# Patient Record
Sex: MALE | Race: WHITE
[De-identification: names, ages, dates, MRNs, and addresses within clinical notes are randomized per-mention and may not be internally consistent; named-entity substitution may affect disease eponyms.]

---

## 2019-02-20 ENCOUNTER — HOSPITAL ENCOUNTER (OUTPATIENT)
Dept: HOSPITAL 75 - CARD | Age: 59
End: 2019-02-20
Attending: INTERNAL MEDICINE
Payer: COMMERCIAL

## 2019-02-20 VITALS — SYSTOLIC BLOOD PRESSURE: 166 MMHG | DIASTOLIC BLOOD PRESSURE: 88 MMHG

## 2019-02-20 VITALS — WEIGHT: 223 LBS | BODY MASS INDEX: 31.92 KG/M2 | HEIGHT: 70 IN

## 2019-02-20 DIAGNOSIS — I10: ICD-10-CM

## 2019-02-20 DIAGNOSIS — R53.83: ICD-10-CM

## 2019-02-20 DIAGNOSIS — R06.02: ICD-10-CM

## 2019-02-20 DIAGNOSIS — E11.9: Primary | ICD-10-CM

## 2019-02-20 LAB
ALBUMIN SERPL-MCNC: 4.7 GM/DL (ref 3.2–4.5)
ALP SERPL-CCNC: 86 U/L (ref 40–136)
ALT SERPL-CCNC: 17 U/L (ref 0–55)
BILIRUB SERPL-MCNC: 0.3 MG/DL (ref 0.1–1)
BUN/CREAT SERPL: 13
CALCIUM SERPL-MCNC: 10.2 MG/DL (ref 8.5–10.1)
CHLORIDE SERPL-SCNC: 105 MMOL/L (ref 98–107)
CHOLEST SERPL-MCNC: 294 MG/DL (ref ?–200)
CO2 SERPL-SCNC: 25 MMOL/L (ref 21–32)
CREAT SERPL-MCNC: 1.26 MG/DL (ref 0.6–1.3)
GFR SERPLBLD BASED ON 1.73 SQ M-ARVRAT: 59 ML/MIN
GLUCOSE SERPL-MCNC: 213 MG/DL (ref 70–105)
HDLC SERPL-MCNC: 55 MG/DL (ref 40–60)
POTASSIUM SERPL-SCNC: 4.6 MMOL/L (ref 3.6–5)
PROT SERPL-MCNC: 7.5 GM/DL (ref 6.4–8.2)
SODIUM SERPL-SCNC: 138 MMOL/L (ref 135–145)
TRIGL SERPL-MCNC: 116 MG/DL (ref ?–150)
VLDLC SERPL CALC-MCNC: 23 MG/DL (ref 5–40)

## 2019-02-20 PROCEDURE — 84153 ASSAY OF PSA TOTAL: CPT

## 2019-02-20 PROCEDURE — 78452 HT MUSCLE IMAGE SPECT MULT: CPT

## 2019-02-20 PROCEDURE — 93017 CV STRESS TEST TRACING ONLY: CPT

## 2019-02-20 PROCEDURE — 80053 COMPREHEN METABOLIC PANEL: CPT

## 2019-02-20 PROCEDURE — 36415 COLL VENOUS BLD VENIPUNCTURE: CPT

## 2019-02-20 PROCEDURE — 93306 TTE W/DOPPLER COMPLETE: CPT

## 2019-02-20 PROCEDURE — 80061 LIPID PANEL: CPT

## 2019-02-20 NOTE — STRESS TEST
DATE OF SERVICE:  02/20/2019



LEXISCAN MYOVIEW STRESS TEST



REFERRING PHYSICIAN:

Dr. Piper Austin.



Baseline heart rate is 61, baseline blood pressure 139/91.  Baseline EKG is

sinus rhythm with no ischemic changes.



In summary, the patient was injected with 10.45 mCi of technetium-99 Myoview and

the resting images were obtained and the patient started exercising with a

baseline heart rate, blood pressure and EKG mentioned above.  The patient was

able to exercise for a total of 6 minutes on standard Vel protocol.  With peak

exercise level, EKG was showing nondiagnostic changes.  Blood pressure was

166/88.  During recovery, heart rate and blood pressure returned to baseline. 

With peak stress level, the patient was injected with 32.5 mCi of technetium-99

Myoview.



The resting and stress images were reviewed and compared in the short axis,

horizontal long axis, and vertical long axis views.  Review of the images showed

good radiotracer uptake with no significant ischemia or infarction.  SSS is 0. 

TID value is 0.93.  On the gated images, the left ventricle appeared to be

normal size with normal contractility.  Calculated ejection fraction is 56%.



CONCLUSION:

1.  Fair exercise tolerance, a total of 6 minutes on standard Vel protocol,

total of 7.3 METs, achieving 88% of maximum expected heart rate.

2.  Hypertensive response to exercise with peak stress level, blood pressure was

192/117.

Minimal nondiagnostic EKG changes with exercise, returned to baseline during

recovery.

3.  No ischemia or infarction on SPECT images.

4.  Normal left ventricular size with normal contractility.  Calculated ejection

fraction is 56%.





Job ID: [id number]

DocumentID: [id number]

Dictated Date:  02/20/2019 15:17:33

Transcription Date: 02/20/2019 15:49:15

Dictated By: PABLO LIZ MD

## 2019-03-06 ENCOUNTER — HOSPITAL ENCOUNTER (OUTPATIENT)
Dept: HOSPITAL 75 - RT | Age: 59
End: 2019-03-06
Attending: INTERNAL MEDICINE
Payer: COMMERCIAL

## 2019-03-06 DIAGNOSIS — E11.9: ICD-10-CM

## 2019-03-06 DIAGNOSIS — R53.83: ICD-10-CM

## 2019-03-06 DIAGNOSIS — R06.02: Primary | ICD-10-CM

## 2019-03-06 DIAGNOSIS — I10: ICD-10-CM

## 2019-03-06 PROCEDURE — 94726 PLETHYSMOGRAPHY LUNG VOLUMES: CPT

## 2019-03-06 PROCEDURE — 94729 DIFFUSING CAPACITY: CPT

## 2019-03-06 PROCEDURE — 94060 EVALUATION OF WHEEZING: CPT
